# Patient Record
Sex: MALE | Race: WHITE | Employment: OTHER | URBAN - NONMETROPOLITAN AREA
[De-identification: names, ages, dates, MRNs, and addresses within clinical notes are randomized per-mention and may not be internally consistent; named-entity substitution may affect disease eponyms.]

---

## 2022-07-27 ENCOUNTER — HOSPITAL ENCOUNTER (EMERGENCY)
Age: 72
Discharge: HOME | End: 2022-07-27
Payer: MEDICARE

## 2022-07-27 ENCOUNTER — APPOINTMENT (OUTPATIENT)
Dept: GENERAL RADIOLOGY | Age: 72
End: 2022-07-27
Payer: MEDICARE

## 2022-07-27 VITALS
HEART RATE: 67 BPM | SYSTOLIC BLOOD PRESSURE: 140 MMHG | RESPIRATION RATE: 16 BRPM | OXYGEN SATURATION: 94 % | DIASTOLIC BLOOD PRESSURE: 70 MMHG | TEMPERATURE: 97.9 F

## 2022-07-27 DIAGNOSIS — S62.663B OPEN NONDISPLACED FRACTURE OF DISTAL PHALANX OF LEFT MIDDLE FINGER, INITIAL ENCOUNTER: Primary | ICD-10-CM

## 2022-07-27 DIAGNOSIS — S61.313A LACERATION OF LEFT MIDDLE FINGER WITHOUT FOREIGN BODY WITH DAMAGE TO NAIL, INITIAL ENCOUNTER: ICD-10-CM

## 2022-07-27 PROCEDURE — 73130 X-RAY EXAM OF HAND: CPT | Performed by: RADIOLOGY

## 2022-07-27 PROCEDURE — 90471 IMMUNIZATION ADMIN: CPT | Performed by: PHYSICIAN ASSISTANT

## 2022-07-27 PROCEDURE — 73120 X-RAY EXAM OF HAND: CPT

## 2022-07-27 PROCEDURE — 99284 EMERGENCY DEPT VISIT MOD MDM: CPT

## 2022-07-27 PROCEDURE — 6360000002 HC RX W HCPCS: Performed by: PHYSICIAN ASSISTANT

## 2022-07-27 PROCEDURE — 2500000003 HC RX 250 WO HCPCS: Performed by: PHYSICIAN ASSISTANT

## 2022-07-27 PROCEDURE — 73130 X-RAY EXAM OF HAND: CPT

## 2022-07-27 PROCEDURE — 12002 RPR S/N/AX/GEN/TRNK2.6-7.5CM: CPT

## 2022-07-27 PROCEDURE — 90715 TDAP VACCINE 7 YRS/> IM: CPT | Performed by: PHYSICIAN ASSISTANT

## 2022-07-27 RX ORDER — LIDOCAINE HYDROCHLORIDE 10 MG/ML
5 INJECTION, SOLUTION EPIDURAL; INFILTRATION; INTRACAUDAL; PERINEURAL ONCE
Status: COMPLETED | OUTPATIENT
Start: 2022-07-27 | End: 2022-07-27

## 2022-07-27 RX ORDER — CEPHALEXIN 500 MG/1
500 CAPSULE ORAL 2 TIMES DAILY
Qty: 14 CAPSULE | Refills: 0 | Status: SHIPPED | OUTPATIENT
Start: 2022-07-27 | End: 2022-08-03

## 2022-07-27 RX ORDER — HYDROCODONE BITARTRATE AND ACETAMINOPHEN 5; 325 MG/1; MG/1
1 TABLET ORAL EVERY 6 HOURS PRN
Qty: 12 TABLET | Refills: 0 | Status: SHIPPED | OUTPATIENT
Start: 2022-07-27 | End: 2022-07-30

## 2022-07-27 RX ADMIN — LIDOCAINE HYDROCHLORIDE 5 ML: 10 INJECTION, SOLUTION EPIDURAL; INFILTRATION; INTRACAUDAL; PERINEURAL at 11:12

## 2022-07-27 RX ADMIN — TETANUS TOXOID, REDUCED DIPHTHERIA TOXOID AND ACELLULAR PERTUSSIS VACCINE, ADSORBED 0.5 ML: 5; 2.5; 8; 8; 2.5 SUSPENSION INTRAMUSCULAR at 11:09

## 2022-07-27 ASSESSMENT — ENCOUNTER SYMPTOMS
VOMITING: 0
ABDOMINAL PAIN: 0
SHORTNESS OF BREATH: 0
NAUSEA: 0

## 2022-07-27 ASSESSMENT — PAIN SCALES - GENERAL: PAINLEVEL_OUTOF10: 3

## 2022-07-27 ASSESSMENT — PAIN DESCRIPTION - LOCATION: LOCATION: FINGER (COMMENT WHICH ONE)

## 2022-07-27 ASSESSMENT — PAIN DESCRIPTION - DESCRIPTORS: DESCRIPTORS: ACHING

## 2022-07-27 ASSESSMENT — PAIN - FUNCTIONAL ASSESSMENT: PAIN_FUNCTIONAL_ASSESSMENT: 0-10

## 2022-07-27 NOTE — ED PROVIDER NOTES
[facility] EMERGENCY DEPT  eMERGENCY dEPARTMENT eNCOUnter      Pt Name: Sarbjit Gracia  MRN: [id number]  [place] 1950  Date of evaluation: 7/27/2022  Provider: Leann Bashir [place] Rachel       Chief Complaint   Patient presents with    Laceration     Pt presents to ED for laceration to left middle finger. Pt was using garden trimmers. Pt was originally seen at [facility], they stated it was to deep of lac, to come to ed. Pt is also on blood thinners. HISTORY OF PRESENT ILLNESS   (Location/Symptom, Timing/Onset,Context/Setting, Quality, Duration, Modifying Factors, Severity)  Note limiting factors. Sarbjit Gracia is a right hand dominant 74 y.o. male who uses Eliquis for anticoagulation chronically who presents to the emergency department with complaint of a laceration to the left middle finger. The patient was using garden tremors whenever he accidentally cut into the distal left middle finger. There was damage to the nail. He was having active bleeding. He originally went to the urgent care who recommended ER visit. He is not up-to-date on tetanus. HPI    NursingNotes were reviewed. REVIEW OF SYSTEMS    (2-9 systems for level 4, 10 or more for level 5)     Review of Systems   Constitutional:  Negative for chills and fever. Respiratory:  Negative for shortness of breath. Cardiovascular:  Negative for chest pain. Gastrointestinal:  Negative for abdominal pain, nausea and vomiting. Musculoskeletal:  Positive for arthralgias. Skin:  Positive for wound. Neurological:  Negative for dizziness and headaches. All other systems reviewed and are negative. PAST MEDICALHISTORY   No past medical history on file. SURGICAL HISTORY     No past surgical history on file. CURRENT MEDICATIONS     Previous Medications    No medications on file       ALLERGIES     Patient has no allergy information on record. FAMILY HISTORY     No family history on file.        SOCIAL HISTORY Social History     Socioeconomic History    Marital status: Single       SCREENINGS    [place] Coma Scale  Eye Opening: Spontaneous  Best Verbal Response: Oriented  Best Motor Response: Obeys commands  [place] Coma Scale Score: 15        PHYSICAL EXAM    (up to 7 for level 4, 8 or more for level 5)     ED Triage Vitals   BP Temp Temp src Heart Rate Resp SpO2 Height Weight   07/27/22 1033 07/27/22 1028 -- 07/27/22 1028 07/27/22 1033 07/27/22 1033 -- --   (!) 140/70 97.9 °F (36.6 °C)  67 16 94 %         Physical Exam  Vitals and nursing note reviewed. Constitutional:       General: He is not in acute distress. Appearance: Normal appearance. He is not ill-appearing, toxic-appearing or diaphoretic. HENT:      Head: Normocephalic and atraumatic. Cardiovascular:      Rate and Rhythm: Normal rate and regular rhythm. Pulses: Normal pulses. Pulmonary:      Effort: Pulmonary effort is normal. No respiratory distress. Musculoskeletal:      Left wrist: No swelling, deformity, tenderness or bony tenderness. Normal range of motion. Normal pulse. Left hand: Laceration, tenderness and bony tenderness present. No swelling or deformity. Normal range of motion. Normal sensation. Normal capillary refill. Cervical back: Normal range of motion and neck supple. Comments: Distal middle finger does have approximately 4 cm laceration that does go through part of the nail. The nail is still in place and there is no damage to the nailbed. It is not actively bleeding. It does appear to be a deep laceration. Skin:     General: Skin is warm and dry. Neurological:      Mental Status: He is alert.        DIAGNOSTIC RESULTS     RADIOLOGY:  Non-plain film images such as CT, Ultrasound and MRI are read by the radiologist. Plain radiographic images are visualized and preliminarily interpreted bythe emergency physician with the below findings:      XR HAND LEFT (MIN 3 VIEWS)   Final Result   Cortical fracture at the tip of the 3rd digit distal phalanx with overlying laceration. No radiodense foreign body. Recommendation:    Follow up as clinically indicated. Note: Direct correlation with point tenderness and the X-ray images is recommended and does significantly increase the sensitivity of radiographic evaluation. Electronically Signed by Edith Burgess MD, MQSA CERTIFIED at [id number] 11:59:50 AM               XR HAND LEFT (MIN 3 VIEWS)    (Results Pending)           LABS:  Labs Reviewed - No data to display    All other labs were within normal range or not returned as of this dictation. EMERGENCY DEPARTMENT COURSE and DIFFERENTIAL DIAGNOSIS/MDM:   Vitals:    Vitals:    07/27/22 1028 07/27/22 1033   BP:  (!) 140/70   Pulse: 67    Resp:  16   Temp: 97.9 °F (36.6 °C)    SpO2:  94%       MDM  Patient is a 78-year-old male presents the ER with a laceration to the left middle finger. On physical exam, there was a distal laceration that was approximately 4 cm and not actively bleeding at this time. There was nail damage involved. Xray was obtained and does confirm a distal tuft fracture. Plan to treat as an open fracture and will give a course of Keflex outpatient. The wound was thoroughly cleaned and dressed prior to closure and he was brought up to date on tetanus. He was given referral for hand surgeon and encouraged to follow up for wound check and suture removal. Return precautions were given to him and he verbalized understanding. All questions were answered. Hemodynamically stable, no signs of compartment syndrome or decrease in tissue perfusion so he is safe for dc at this time.      PROCEDURES:  Unless otherwise noted below, none     Lac Repair    Date/Time: 7/27/2022 1:14 PM  Performed by: LEAH Rae  Authorized by: LEAH Rae     Consent:     Consent obtained:  Verbal    Consent given by:  Patient    Risks, benefits, and alternatives were discussed: yes    Anesthesia: Anesthesia method:  Nerve block    Block location:  Digital block of left middle finger    Block needle gauge:  25 G    Block anesthetic:  Lidocaine 1% w/o epi    Block injection procedure:  Anatomic landmarks identified, negative aspiration for blood and introduced needle    Block outcome:  Anesthesia achieved  Laceration details:     Location:  Finger    Finger location:  L long finger    Length (cm):  4  Pre-procedure details:     Preparation:  Patient was prepped and draped in usual sterile fashion  Exploration:     Hemostasis achieved with:  Direct pressure    Imaging obtained: x-ray      Imaging outcome: foreign body not noted      Wound extent: underlying fracture      Wound extent: no foreign bodies/material noted, no nerve damage noted and no tendon damage noted    Treatment:     Area cleansed with:  Chlorhexidine    Amount of cleaning:  Extensive    Irrigation solution:  Sterile saline    Visualized foreign bodies/material removed: no      Debridement:  None    Undermining:  None  Skin repair:     Repair method:  Sutures    Suture size:  4-0    Suture material:  Nylon    Suture technique:  Simple interrupted    Number of sutures:  4  Approximation:     Approximation:  Close  Repair type:     Repair type:  Simple  Post-procedure details:     Dressing:  Antibiotic ointment, splint for protection and non-adherent dressing    Procedure completion:  Tolerated well, no immediate complications    FINAL IMPRESSION      1. Open nondisplaced fracture of distal phalanx of left middle finger, initial encounter    2.  Laceration of left middle finger without foreign body with damage to nail, initial encounter          DISPOSITION/PLAN   DISPOSITION Decision To Discharge 07/27/2022 12:41:19 PM      PATIENT REFERRED TO:  MD Elsa Thrasher [address] [place] [address] [phone]            DISCHARGE MEDICATIONS:  New Prescriptions    CEPHALEXIN (KEFLEX) 500 MG CAPSULE    Take 1 capsule by mouth in the morning and 1 capsule before bedtime. Do all this for 7 days.           (Please note that portions of this note were completed with a voice recognition program.  Efforts were made to edit thedictations but occasionally words are mis-transcribed.)    LEAH Newman (electronically signed)        Azeb Boyer Aladominiquema  07/27/22 3269

## 2022-07-27 NOTE — DISCHARGE INSTRUCTIONS
Follow up with orthopedics in 1 week for suture removal and wound check. Take antibiotics as prescribed. I also recommend establishing yourself with a primary care provider now that you have moved to [place]. Return to the ER for new or worsening symptoms.

## 2022-07-27 NOTE — ED NOTES
Bandage placed, along with finger splint. Pt verbalized d/c instructions. Ambulated off unit.       Kaila Villanueva RN  07/27/22 5524

## 2022-09-06 ENCOUNTER — OFFICE VISIT (OUTPATIENT)
Dept: PRIMARY CARE CLINIC | Age: 72
End: 2022-09-06
Payer: MEDICARE

## 2022-09-06 VITALS
HEART RATE: 67 BPM | HEIGHT: 66 IN | TEMPERATURE: 98 F | OXYGEN SATURATION: 98 % | DIASTOLIC BLOOD PRESSURE: 100 MMHG | BODY MASS INDEX: 36.16 KG/M2 | WEIGHT: 225 LBS | SYSTOLIC BLOOD PRESSURE: 154 MMHG | RESPIRATION RATE: 20 BRPM

## 2022-09-06 DIAGNOSIS — R79.9 ABNORMAL FINDING OF BLOOD CHEMISTRY, UNSPECIFIED: ICD-10-CM

## 2022-09-06 DIAGNOSIS — I10 PRIMARY HYPERTENSION: ICD-10-CM

## 2022-09-06 DIAGNOSIS — E78.5 DYSLIPIDEMIA: ICD-10-CM

## 2022-09-06 DIAGNOSIS — E66.09 CLASS 2 OBESITY DUE TO EXCESS CALORIES WITHOUT SERIOUS COMORBIDITY WITH BODY MASS INDEX (BMI) OF 36.0 TO 36.9 IN ADULT: ICD-10-CM

## 2022-09-06 DIAGNOSIS — I10 PRIMARY HYPERTENSION: Primary | ICD-10-CM

## 2022-09-06 DIAGNOSIS — I48.91 ATRIAL FIBRILLATION, UNSPECIFIED TYPE (HCC): ICD-10-CM

## 2022-09-06 DIAGNOSIS — N28.89 RENAL MASS: ICD-10-CM

## 2022-09-06 DIAGNOSIS — E03.9 HYPOTHYROIDISM, UNSPECIFIED TYPE: ICD-10-CM

## 2022-09-06 LAB
ALBUMIN SERPL-MCNC: 4.5 G/DL (ref 3.5–5.2)
ALP BLD-CCNC: 88 U/L (ref 40–130)
ALT SERPL-CCNC: 16 U/L (ref 5–41)
ANION GAP SERPL CALCULATED.3IONS-SCNC: 14 MMOL/L (ref 7–19)
AST SERPL-CCNC: 22 U/L (ref 5–40)
BILIRUB SERPL-MCNC: 0.9 MG/DL (ref 0.2–1.2)
BUN BLDV-MCNC: 23 MG/DL (ref 8–23)
CALCIUM SERPL-MCNC: 10 MG/DL (ref 8.8–10.2)
CHLORIDE BLD-SCNC: 101 MMOL/L (ref 98–111)
CHOLESTEROL, FASTING: 210 MG/DL (ref 160–199)
CO2: 25 MMOL/L (ref 22–29)
CREAT SERPL-MCNC: 1.1 MG/DL (ref 0.5–1.2)
GFR AFRICAN AMERICAN: >59
GFR NON-AFRICAN AMERICAN: >60
GLUCOSE BLD-MCNC: 88 MG/DL (ref 74–109)
HBA1C MFR BLD: 5.7 % (ref 4–6)
HDLC SERPL-MCNC: 84 MG/DL (ref 55–121)
LDL CHOLESTEROL CALCULATED: 106 MG/DL
POTASSIUM SERPL-SCNC: 5.3 MMOL/L (ref 3.5–5)
SODIUM BLD-SCNC: 140 MMOL/L (ref 136–145)
T4 FREE: 1.3 NG/DL (ref 0.93–1.7)
TOTAL PROTEIN: 6.7 G/DL (ref 6.6–8.7)
TRIGLYCERIDE, FASTING: 100 MG/DL (ref 0–149)
TSH REFLEX FT4: 4.26 UIU/ML (ref 0.35–5.5)

## 2022-09-06 PROCEDURE — G8417 CALC BMI ABV UP PARAM F/U: HCPCS | Performed by: STUDENT IN AN ORGANIZED HEALTH CARE EDUCATION/TRAINING PROGRAM

## 2022-09-06 PROCEDURE — 3017F COLORECTAL CA SCREEN DOC REV: CPT | Performed by: STUDENT IN AN ORGANIZED HEALTH CARE EDUCATION/TRAINING PROGRAM

## 2022-09-06 PROCEDURE — G8427 DOCREV CUR MEDS BY ELIG CLIN: HCPCS | Performed by: STUDENT IN AN ORGANIZED HEALTH CARE EDUCATION/TRAINING PROGRAM

## 2022-09-06 PROCEDURE — 99204 OFFICE O/P NEW MOD 45 MIN: CPT | Performed by: STUDENT IN AN ORGANIZED HEALTH CARE EDUCATION/TRAINING PROGRAM

## 2022-09-06 PROCEDURE — 1123F ACP DISCUSS/DSCN MKR DOCD: CPT | Performed by: STUDENT IN AN ORGANIZED HEALTH CARE EDUCATION/TRAINING PROGRAM

## 2022-09-06 PROCEDURE — 1036F TOBACCO NON-USER: CPT | Performed by: STUDENT IN AN ORGANIZED HEALTH CARE EDUCATION/TRAINING PROGRAM

## 2022-09-06 RX ORDER — METOPROLOL TARTRATE 100 MG/1
100 TABLET ORAL NIGHTLY
COMMUNITY

## 2022-09-06 RX ORDER — LEVOTHYROXINE SODIUM 0.05 MG/1
50 TABLET ORAL DAILY
COMMUNITY

## 2022-09-06 RX ORDER — AMOXICILLIN 500 MG/1
500 CAPSULE ORAL 3 TIMES DAILY
COMMUNITY
End: 2022-09-16 | Stop reason: ALTCHOICE

## 2022-09-06 RX ORDER — LOSARTAN POTASSIUM 100 MG/1
100 TABLET ORAL DAILY
COMMUNITY

## 2022-09-06 RX ORDER — ATORVASTATIN CALCIUM 20 MG/1
20 TABLET, FILM COATED ORAL NIGHTLY
COMMUNITY

## 2022-09-06 SDOH — ECONOMIC STABILITY: FOOD INSECURITY: WITHIN THE PAST 12 MONTHS, YOU WORRIED THAT YOUR FOOD WOULD RUN OUT BEFORE YOU GOT MONEY TO BUY MORE.: NEVER TRUE

## 2022-09-06 SDOH — ECONOMIC STABILITY: FOOD INSECURITY: WITHIN THE PAST 12 MONTHS, THE FOOD YOU BOUGHT JUST DIDN'T LAST AND YOU DIDN'T HAVE MONEY TO GET MORE.: NEVER TRUE

## 2022-09-06 ASSESSMENT — SOCIAL DETERMINANTS OF HEALTH (SDOH): HOW HARD IS IT FOR YOU TO PAY FOR THE VERY BASICS LIKE FOOD, HOUSING, MEDICAL CARE, AND HEATING?: NOT HARD AT ALL

## 2022-09-06 NOTE — PROGRESS NOTES
[address] [facility]  [address] [place] [id number] [address] [facility] [id number]  Dept: [phone]  Dept Fax: [phone]  Loc: [phone]      Subjective:     Chief Complaint   Patient presents with    New Patient       HPI:  Niranjan Abdul is a 74 y.o. male presenting for    Here to establish care. Originally from [address] [place] [address], has been living more recently in [facility]    H/o R kidney mass, treated w/ cryoablation 06/2021. Was recommended 4 years of surveillance. F/u MRI done this year shows no mass  GERD and Rivera's esophagus-On PPI, seeing Dr. Ramona Alexandra [facility]) for EGD next month. Needing cardiology clearance. CLN done 12/2021, polyps removed. Recall 3 years  Afib-S/p cryoablation. Was on flecainide which was discontinued. On metoprolol 100mg and eliquis. He notes brief periods of going into a fib that he feels subjectively. He has known murmur, previous echocardiogram showing MVR/TVR  HLD-On lipitor 20mg  Hypothyroidism-On levothyroxine 50mcg  DDD spine-Has MRI from 2020 showing clinically significant DDD, disc bulging and moderate spinal stenosis. He endorses R sided sciatic pain at times, says he has been busy w/ other medical conditions right now to address.     ROS:   Reviewed with patient and noted to be negative except that listed in HPI    PMHx:  Past Medical History:   Diagnosis Date    A-fib ([facility] [address]     Rivera's esophagus     Bulging lumbar disc     CKD (chronic kidney disease) stage 2, GFR 60-89 ml/min     DDD (degenerative disc disease), cervical     Disorder of hypopharynx     Dry skin     arms    Hearing loss     left and right ears    Hypertension     Hypothyroidism     Radiculopathy     Reflux esophagitis     Sleep apnea     Spondylosis     lower cervical     Patient Active Problem List   Diagnosis    Renal mass    Dyslipidemia    Atrial fibrillation (HCC)    Primary hypertension    Class 2 obesity due to excess calories without serious comorbidity with body mass index (BMI) of membranes are moist.      Pharynx: Oropharynx is clear. No oropharyngeal exudate or posterior oropharyngeal erythema. Eyes:      General: No scleral icterus. Extraocular Movements: Extraocular movements intact. Conjunctiva/sclera: Conjunctivae normal.   Cardiovascular:      Rate and Rhythm: Normal rate and regular rhythm. Pulses: Normal pulses. Heart sounds: Murmur heard. Pulmonary:      Effort: Pulmonary effort is normal.      Breath sounds: Normal breath sounds. Musculoskeletal:         General: Normal range of motion. Cervical back: Normal range of motion. Skin:     General: Skin is warm and dry. Capillary Refill: Capillary refill takes less than 2 seconds. Neurological:      General: No focal deficit present. Mental Status: He is alert and oriented to person, place, and time. Psychiatric:         Mood and Affect: Mood normal.         Behavior: Behavior normal.         Thought Content: Thought content normal.          Assessment & Plan   Miller Ervin was seen today for new patient. Diagnoses and all orders for this visit:    Primary hypertension  -Continue losartan for now. Given BP log to fill in prior to next appt. May need additional medication if continues to be high at next visit  -     Comprehensive Metabolic Panel; Future  -     Microalbumin, Ur; Future    Dyslipidemia  -Continue atorvastatin  -     Lipid, Fasting; Future    Atrial fibrillation, unspecified type ([address]  -Continue BB, eliquis.   -     Gerardo Krishnamurthy MD, Cardiology, [place]    Renal mass  -     Jovany Dumas MD, Urology, [place]    Hypothyroidism, unspecified type  -Continue levothyroxine 50mcg  -     TSH with Reflex to FT4; Future    Class 2 obesity due to excess calories without serious comorbidity with body mass index (BMI) of 36.0 to 36.9 in adult  -     Hemoglobin A1C; Future    Abnormal finding of blood chemistry, unspecified   -     Hemoglobin A1C; Future    Over 50% of the total visit time of 45 minutes was spent on counseling and or coordination of care of    Diagnosis Orders   1. Primary hypertension  Comprehensive Metabolic Panel    Microalbumin, Ur      2. Dyslipidemia  Lipid, Fasting      3. Atrial fibrillation, unspecified type [facility])  Elio Padron MD, Cardiology, [place]      4. Renal mass  Lucien Holm MD, Urology, [place]      5. Hypothyroidism, unspecified type  TSH with Reflex to FT4      6. Class 2 obesity due to excess calories without serious comorbidity with body mass index (BMI) of 36.0 to 36.9 in adult  Hemoglobin A1C      7. Abnormal finding of blood chemistry, unspecified   Hemoglobin A1C              Return in about 4 weeks (around 10/4/2022) for F/u labs, BP, referral.    All questions were answered. Medications, including possible adverse effects, and instructions were reviewed and  understanding was confirmed. Follow-up recommendations, including when to contact or return to office (ie; if symptoms worsen or fail to improve), were discussed and acknowledged.     Electronically signed by Jayro Hedrick MD on 9/6/22 at 11:14 AM CDT

## 2022-09-06 NOTE — PATIENT INSTRUCTIONS
[address] and [facility] [place], Cardiology  4.1  7 Google reviews  Heart hospital in [facility], [address] info: "[employer]"  Get online care: "[employer]"  Located in: [facility]  Address: [address], [facility]  Hours:   Open ?  Closes 4:30PM  Phone: [phone]
no

## 2022-09-14 ENCOUNTER — OFFICE VISIT (OUTPATIENT)
Dept: CARDIOLOGY CLINIC | Age: 72
End: 2022-09-14
Payer: MEDICARE

## 2022-09-14 ENCOUNTER — TRANSCRIBE ORDERS (OUTPATIENT)
Dept: ADMINISTRATIVE | Age: 72
End: 2022-09-14

## 2022-09-14 VITALS
BODY MASS INDEX: 36.16 KG/M2 | HEIGHT: 66 IN | OXYGEN SATURATION: 97 % | SYSTOLIC BLOOD PRESSURE: 144 MMHG | HEART RATE: 71 BPM | WEIGHT: 225 LBS | DIASTOLIC BLOOD PRESSURE: 88 MMHG

## 2022-09-14 DIAGNOSIS — R09.89 BRUIT OF LEFT CAROTID ARTERY: Primary | ICD-10-CM

## 2022-09-14 DIAGNOSIS — R09.89 LEFT CAROTID BRUIT: ICD-10-CM

## 2022-09-14 DIAGNOSIS — I48.91 ATRIAL FIBRILLATION, UNSPECIFIED TYPE (HCC): Primary | ICD-10-CM

## 2022-09-14 PROCEDURE — G8417 CALC BMI ABV UP PARAM F/U: HCPCS | Performed by: INTERNAL MEDICINE

## 2022-09-14 PROCEDURE — 93000 ELECTROCARDIOGRAM COMPLETE: CPT | Performed by: INTERNAL MEDICINE

## 2022-09-14 PROCEDURE — 1036F TOBACCO NON-USER: CPT | Performed by: INTERNAL MEDICINE

## 2022-09-14 PROCEDURE — 99205 OFFICE O/P NEW HI 60 MIN: CPT | Performed by: INTERNAL MEDICINE

## 2022-09-14 PROCEDURE — 1123F ACP DISCUSS/DSCN MKR DOCD: CPT | Performed by: INTERNAL MEDICINE

## 2022-09-14 PROCEDURE — G8427 DOCREV CUR MEDS BY ELIG CLIN: HCPCS | Performed by: INTERNAL MEDICINE

## 2022-09-14 PROCEDURE — 3017F COLORECTAL CA SCREEN DOC REV: CPT | Performed by: INTERNAL MEDICINE

## 2022-09-14 NOTE — PROGRESS NOTES
Gibran Mazariegos is a 74 y.o. male presents with a history of atrial fibrillation. He is here for evaluation prior to endoscopy. He had a pulmonary vein isolation a year and a half ago. He has not felt any atrial fibrillation since that time. He does have a home monitor which reports an occasional irregular pulse. He denies any tachycardia or other problems. He has had no bleeding complications on anticoagulation. Review of Systems   Constitutional: Negative for fever, chills, diaphoresis, activity change, appetite change, fatigue and unexpected weight change. Eyes: Negative for photophobia, pain, redness and visual disturbance. Respiratory: Negative for apnea, cough, chest tightness, shortness of breath, wheezing and stridor. Cardiovascular: Negative for chest pain, palpitations and leg swelling. Gastrointestinal: Negative for abdominal distention. Genitourinary: Negative for dysuria, urgency and frequency. Musculoskeletal: Negative for myalgias, arthralgias and gait problem. Skin: Negative for color change, pallor, rash and wound. Neurological: Negative for dizziness, tremors, speech difficulty, weakness and numbness. Hematological: Does not bruise/bleed easily. Psychiatric/Behavioral: Negative.         Past Medical History:   Diagnosis Date    A-fib ([facility] [address]     Rivera's esophagus     Bulging lumbar disc     CKD (chronic kidney disease) stage 2, GFR 60-89 ml/min     DDD (degenerative disc disease), cervical     Disorder of hypopharynx     Dry skin     arms    Hearing loss     left and right ears    Hypertension     Hypothyroidism     Radiculopathy     Reflux esophagitis     Sleep apnea     Spondylosis     lower cervical       Past Surgical History:   Procedure Laterality Date    CRYOABLATION      Heart [birth year]    HALO APPLICATION      abalations to treat barretts  4-22-09 6-22-09 05-19-10    and 07-    HERNIA REPAIR      40 years ago    KNEE SURGERY      meniscus tear ,    LAPAROSCOPIC NISSEN FUNDOPLICATION      84-    RENAL CRYOABLATION Right     06-01-21    TOTAL KNEE ARTHROPLASTY Right     sept 30 2013 replace 2014       No family history on file.     Social History     Socioeconomic History    Marital status: Single     Spouse name: Not on file    Number of children: Not on file    Years of education: Not on file    Highest education level: Not on file   Occupational History    Not on file   Tobacco Use    Smoking status: Never    Smokeless tobacco: Never   Substance and Sexual Activity    Alcohol use: Yes     Comment: social    Drug use: Never    Sexual activity: Not on file   Other Topics Concern    Not on file   Social History Narrative    Not on file     Social Determinants of Health     Financial Resource Strain: Low Risk     Difficulty of Paying Living Expenses: Not hard at all   Food Insecurity: No Food Insecurity    Worried About Running Out of Food in the Last Year: Never true    Ran Out of Food in the Last Year: Never true   Transportation Needs: Not on file   Physical Activity: Not on file   Stress: Not on file   Social Connections: Not on file   Intimate Partner Violence: Not on file   Housing Stability: Not on file       Allergies   Allergen Reactions    Accupril [Quinapril Hcl]      cough    Lopressor [Metoprolol]      Continuous cough    Vasotec [Enalapril]      cough    Lisinopril      Cough      Plendil [Felodipine]      Cough      Verapamil      Cough           Current Outpatient Medications:     metoprolol (LOPRESSOR) 100 MG tablet, Take 100 mg by mouth nightly, Disp: , Rfl:     atorvastatin (LIPITOR) 20 MG tablet, Take 20 mg by mouth nightly, Disp: , Rfl:     apixaban (ELIQUIS) 5 MG TABS tablet, Take by mouth 2 times daily, Disp: , Rfl:     levothyroxine (SYNTHROID) 50 MCG tablet, Take 50 mcg by mouth Daily, Disp: , Rfl:     losartan (COZAAR) 100 MG tablet, Take 100 mg by mouth daily, Disp: , Rfl:     amoxicillin (AMOXIL) 500 MG capsule, Take 500 mg by mouth 3 times daily, Disp: , Rfl:     PE:  Vitals:    09/14/22 1117 09/14/22 1125   BP: (!) 148/90 (!) 144/88   Pulse: 71    SpO2: 97%    Weight: 225 lb (102.1 kg)    Height: 5' 6\" (1.676 m)        Estimated body mass index is 36.32 kg/m² as calculated from the following:    Height as of this encounter: 5' 6\" (1.676 m). Weight as of this encounter: 225 lb (102.1 kg). Constitutional: He is oriented to person, place, and time. He appears well-developed and well-nourished in no acute distress. Neck:  Neck supple without JVD present. No trachea deviation present. No thyromegaly present. Eyes:Conjunctivae and EOM are normal. Pupils equal and reactive to light. ENT:Hearing appears normal.conjunctiva and lids are normal, ears and nose appear normal.  Cardiovascular: Normal rate, S1-S2 regular rhythm, normal heart sounds. 3 out of 6 systolic ejection murmur ascultated. No gallop and no friction rub. Left carotid bruits. No peripheral edema. Pulmonary/Chest:  Lungs clear to auscultation bilaterally without evidence of respiratory distress. He without wheezes. He without rales or ronchi. Musculoskeletal: Normal range of motion. Gait is normal no assitive device. Head is normocephalic and atraumatic. Skin: Skin is warm and dry without rash or pallor. Psychiatric:He is alert and oriented to person, place, and time. He has a normal mood and affect. His behavior is normal. Thought content normal.       Lab Results   Component Value Date/Time    CREATININE 1.1 09/06/2022 12:34 PM           ECG 09/14/22    Sinus rhythm normal ECG    TTE -normal ejection fraction 60% with mild to moderate mitral regurgitation         Assessment, Recommendations, & Plan:  74 y.o. male with history of atrial fibrillation status post pulmonary vein isolation. It does not sound like he has had any recurrence of atrial fibrillation but I agree with continuing anticoagulation.     He was told he needed to bridge with Lovenox for his endoscopy but he in fact does not have a mechanical valve. He may simply stop his Eliquis 2 days prior to the endoscopy and restart the next day. He may proceed with the endoscopy without any further testing. He has an aortic valve murmur but this was not reported on his echocardiogram.  He does have a left-sided carotid bruit but this may be radiation from his valve. Regardless I will perform carotid ultrasound        Disposition - RTC in 12 months or sooner if needed      Please do not hesitate to contact me for any questions or concerns. Dr. Gael Peters.  [place]  Electrophysiology and Cardiology  [facility] [place], Cardiology  [phone]

## 2022-09-16 ENCOUNTER — OFFICE VISIT (OUTPATIENT)
Dept: UROLOGY | Age: 72
End: 2022-09-16
Payer: MEDICARE

## 2022-09-16 VITALS — TEMPERATURE: 97.9 F | BODY MASS INDEX: 35.84 KG/M2 | HEIGHT: 66 IN | WEIGHT: 223 LBS

## 2022-09-16 DIAGNOSIS — C64.1 RENAL CANCER, RIGHT (HCC): ICD-10-CM

## 2022-09-16 DIAGNOSIS — N28.89 RENAL MASS: Primary | ICD-10-CM

## 2022-09-16 LAB
BILIRUBIN, POC: NORMAL
BLOOD URINE, POC: NORMAL
CLARITY, POC: CLEAR
COLOR, POC: YELLOW
GLUCOSE URINE, POC: NORMAL
KETONES, POC: NORMAL
LEUKOCYTE EST, POC: NORMAL
NITRITE, POC: NORMAL
PH, POC: 5.5
PROTEIN, POC: NORMAL
SPECIFIC GRAVITY, POC: 1.02
UROBILINOGEN, POC: 0.2

## 2022-09-16 PROCEDURE — G8417 CALC BMI ABV UP PARAM F/U: HCPCS | Performed by: UROLOGY

## 2022-09-16 PROCEDURE — 1036F TOBACCO NON-USER: CPT | Performed by: UROLOGY

## 2022-09-16 PROCEDURE — 99204 OFFICE O/P NEW MOD 45 MIN: CPT | Performed by: UROLOGY

## 2022-09-16 PROCEDURE — 3017F COLORECTAL CA SCREEN DOC REV: CPT | Performed by: UROLOGY

## 2022-09-16 PROCEDURE — 81003 URINALYSIS AUTO W/O SCOPE: CPT | Performed by: UROLOGY

## 2022-09-16 PROCEDURE — G8427 DOCREV CUR MEDS BY ELIG CLIN: HCPCS | Performed by: UROLOGY

## 2022-09-16 PROCEDURE — 1123F ACP DISCUSS/DSCN MKR DOCD: CPT | Performed by: UROLOGY

## 2022-09-16 RX ORDER — OMEPRAZOLE 20 MG/1
CAPSULE, DELAYED RELEASE ORAL
COMMUNITY

## 2022-09-16 ASSESSMENT — ENCOUNTER SYMPTOMS
TROUBLE SWALLOWING: 0
NAUSEA: 0
EYE DISCHARGE: 0
EYE REDNESS: 0
CHEST TIGHTNESS: 0
VOMITING: 0
SHORTNESS OF BREATH: 0
COLOR CHANGE: 0
FACIAL SWELLING: 0

## 2022-09-16 NOTE — PROGRESS NOTES
Maury Emerson is a 74 y.o. male who presents today   Chief Complaint   Patient presents with    New Patient     I am here today to establish care with local urologist due to history of renal mass. Renal Cancer:  Patient he is here to establish care he moved here from the [place] area. Patient is here today for a history of renal carcinoma which was diagnosed approximately 1 years(s) ago. Patient was treated with CT Cryoablation of right renal tumor on 6/1/2021 at [facility] in [facility] this treatment was chosen because of chronic kidney disease. No records that I was given no tissue biopsy or diagnosis is documented. It is assumed this is for renal cell carcinoma initial renal ultrasound on 4/16/2021 revealed a 3.8 x 3 x 3.6 solid-appearing right renal mass. It is recommended that he have a yearly MRI for follow-up. MRI done on 4/28/2021 post ablation reveals a 4.3 cm right renal cyst.  There is a solid partially hemorrhagic mass in the right kidney measuring 3.8 x 2.8 x 3.8 the prior ablation site. MRI report of the abdomen with and without contrast on 4/14/2021 showed post ablation changes and internal hemorrhage and adjacent fat necrosis 4 cm in the right midpole. The cancer was: right   The treatment received was cryoablation on 6/1/2021. Final pathology stage: T1b  Flank pain? no  Hematuria? None     Patient denies any other symptoms at this time. He reports some incomplete emptying. He did have a PSA done on 2/13/2022 for routine screening which was 2.8 he denies taking any medications currently or problems from his prostate.     Past Medical History:   Diagnosis Date    A-fib ([facility] [address]     Rivera's esophagus     Bulging lumbar disc     CKD (chronic kidney disease) stage 2, GFR 60-89 ml/min     DDD (degenerative disc disease), cervical     Disorder of hypopharynx     Dry skin     arms    Hearing loss     left and right ears    Hypertension     Hypothyroidism Radiculopathy     Reflux esophagitis     Sleep apnea     Spondylosis     lower cervical       Past Surgical History:   Procedure Laterality Date    CRYOABLATION      Heart 52-43-33    HALO APPLICATION      abalations to treat barretts  4-22-09 6-22-09 05-19-10    and 07-    HERNIA REPAIR      40 years ago    KNEE SURGERY      meniscus tear ,    LAPAROSCOPIC NISSEN FUNDOPLICATION      21-    RENAL CRYOABLATION Right     06-01-21    TOTAL KNEE ARTHROPLASTY Right     sept 30 2013 replace 2014       Current Outpatient Medications   Medication Sig Dispense Refill    omeprazole (PRILOSEC) 20 MG delayed release capsule omeprazole 20 mg capsule,delayed release   TAKE 1 CAPSULE BY MOUTH EVERY DAY      metoprolol (LOPRESSOR) 100 MG tablet Take 100 mg by mouth nightly      atorvastatin (LIPITOR) 20 MG tablet Take 20 mg by mouth nightly      apixaban (ELIQUIS) 5 MG TABS tablet Take by mouth 2 times daily      levothyroxine (SYNTHROID) 50 MCG tablet Take 50 mcg by mouth Daily      losartan (COZAAR) 100 MG tablet Take 100 mg by mouth daily       No current facility-administered medications for this visit.        Allergies   Allergen Reactions    Accupril [Quinapril Hcl]      cough    Lopressor [Metoprolol]      Continuous cough    Vasotec [Enalapril]      cough    Lisinopril      Cough      Plendil [Felodipine]      Cough      Verapamil      Cough         Social History     Socioeconomic History    Marital status: Single     Spouse name: None    Number of children: None    Years of education: None    Highest education level: None   Tobacco Use    Smoking status: Never    Smokeless tobacco: Never   Substance and Sexual Activity    Alcohol use: Yes     Comment: social    Drug use: Never     Social Determinants of Health     Financial Resource Strain: Low Risk     Difficulty of Paying Living Expenses: Not hard at all   Food Insecurity: No Food Insecurity    Worried About Running Out of Food in the Last Year: Never true    Ran Out of Food in the Last Year: Never true       No family history on file. REVIEW OF SYSTEMS:  Review of Systems   Constitutional:  Negative for chills and fever. HENT:  Negative for facial swelling and trouble swallowing. Eyes:  Negative for discharge and redness. Respiratory:  Negative for chest tightness and shortness of breath. Cardiovascular:  Negative for chest pain and palpitations. Gastrointestinal:  Negative for nausea and vomiting. Endocrine: Negative for cold intolerance and heat intolerance. Genitourinary:  Negative for decreased urine volume and genital sores. Musculoskeletal:  Negative for joint swelling and neck pain. Skin:  Negative for color change and rash. Allergic/Immunologic: Negative for environmental allergies and immunocompromised state. Neurological:  Negative for dizziness and numbness. Hematological:  Negative for adenopathy. Does not bruise/bleed easily. Psychiatric/Behavioral:  Negative for behavioral problems and hallucinations. PHYSICAL EXAM:  Temp 97.9 °F (36.6 °C) (Temporal)   Ht 5' 6\" (1.676 m)   Wt 223 lb (101.2 kg)   BMI 35.99 kg/m²   Physical Exam  Constitutional:       General: He is not in acute distress. Appearance: Normal appearance. He is well-developed. HENT:      Head: Normocephalic and atraumatic. Nose: Nose normal.   Eyes:      General: No scleral icterus. Conjunctiva/sclera: Conjunctivae normal.      Pupils: Pupils are equal, round, and reactive to light. Neck:      Trachea: No tracheal deviation. Cardiovascular:      Rate and Rhythm: Normal rate and regular rhythm. Pulses: Normal pulses. Pulmonary:      Effort: Pulmonary effort is normal. No respiratory distress. Breath sounds: No stridor. Abdominal:      General: There is no distension. Palpations: Abdomen is soft. There is no mass. Tenderness: There is no abdominal tenderness.    Musculoskeletal: mass  Status post cryoablation of right 4 cm mid renal mass. Prior imaging suggest this is stable. He is due a 6-month follow-up MRI at this point.  - POCT Urinalysis No Micro (Auto)  - MRI ABDOMEN W WO CONTRAST; Future    2. Renal cancer, right ([address]  Presumed renal cell carcinoma continue to monitor with serial imaging  - MRI ABDOMEN W WO CONTRAST; Future      Orders Placed This Encounter   Procedures    MRI ABDOMEN W WO CONTRAST     Standing Status:   Future     Standing Expiration Date:   9/16/2023     Order Specific Question:   STAT Creatinine as needed:     Answer:   Yes     Order Specific Question:   Reason for exam:     Answer:   Status post cryoablation of 4 cm right renal cancer done in June 2021. Follow-up MRI for renal mass protocol. Order Specific Question:   What is the sedation requirement? Answer:   None     Order Specific Question:   Specify organ? Answer:   Kidneys     Comments:   Renal mass protocol    POCT Urinalysis No Micro (Auto)        Return in about 1 month (around 10/16/2022) for office visit after xray study. All information inputted into the note by the MA to include chief complaint, past medical history, past surgical history, medications, allergies, social and family history and review of systems has been reviewed and updated as needed by me. EMR Dragon/transcription disclaimer: Much of this documentt is electronic  transcription/translation of spoken language to printed text. The  electronic translation of spoken language may be erroneous, or at times,  nonsensical words or phrases may be inadvertently transcribed.  Although I  have reviewed the document for such errors, some may still exist.

## 2022-10-03 ENCOUNTER — HOSPITAL ENCOUNTER (OUTPATIENT)
Dept: VASCULAR LAB | Age: 72
Discharge: HOME | End: 2022-10-03
Payer: MEDICARE

## 2022-10-03 DIAGNOSIS — R09.89 BRUIT OF LEFT CAROTID ARTERY: ICD-10-CM

## 2022-10-03 PROCEDURE — 93880 EXTRACRANIAL BILAT STUDY: CPT

## 2022-10-04 ENCOUNTER — TELEPHONE (OUTPATIENT)
Dept: PRIMARY CARE CLINIC | Age: 72
End: 2022-10-04

## 2022-10-04 ENCOUNTER — TELEPHONE (OUTPATIENT)
Dept: CARDIOLOGY CLINIC | Age: 72
End: 2022-10-04

## 2022-10-04 NOTE — TELEPHONE ENCOUNTER
Called pt to change his appointment with Dr. Cleo Roberto. Pt states after his appointment tomorrow he is moving to [facility] so he did not reschedule. Pt asked about getting his records, I advised him to come to the office and fill out a MARYLOU.

## 2022-10-04 NOTE — TELEPHONE ENCOUNTER
----- Message from Paulie Reza MD sent at 10/4/2022  2:41 PM CDT -----  Let him know no significant blockage in neck thx

## 2023-09-14 ENCOUNTER — TELEPHONE (OUTPATIENT)
Dept: CARDIOLOGY CLINIC | Age: 73
End: 2023-09-14